# Patient Record
Sex: MALE
[De-identification: names, ages, dates, MRNs, and addresses within clinical notes are randomized per-mention and may not be internally consistent; named-entity substitution may affect disease eponyms.]

---

## 2023-03-16 PROBLEM — Z00.00 ENCOUNTER FOR PREVENTIVE HEALTH EXAMINATION: Status: ACTIVE | Noted: 2023-03-16

## 2023-03-17 ENCOUNTER — APPOINTMENT (OUTPATIENT)
Dept: UROLOGY | Facility: CLINIC | Age: 63
End: 2023-03-17
Payer: COMMERCIAL

## 2023-03-17 VITALS
TEMPERATURE: 98.6 F | RESPIRATION RATE: 16 BRPM | DIASTOLIC BLOOD PRESSURE: 87 MMHG | SYSTOLIC BLOOD PRESSURE: 138 MMHG | WEIGHT: 190 LBS | OXYGEN SATURATION: 98 % | BODY MASS INDEX: 27.2 KG/M2 | HEART RATE: 64 BPM | HEIGHT: 70 IN

## 2023-03-17 DIAGNOSIS — R39.9 UNSPECIFIED SYMPTOMS AND SIGNS INVOLVING THE GENITOURINARY SYSTEM: ICD-10-CM

## 2023-03-17 PROCEDURE — 99204 OFFICE O/P NEW MOD 45 MIN: CPT

## 2023-03-17 PROCEDURE — 51798 US URINE CAPACITY MEASURE: CPT

## 2023-03-21 LAB
BACTERIA UR CULT: NORMAL
PSA SERPL-MCNC: 2.67 NG/ML

## 2023-03-21 NOTE — HISTORY OF PRESENT ILLNESS
[FreeTextEntry1] : Language: English\par Date of First visit: 03/17/2023 \par Accompanied by: self\par Contact info: \par Referring Provider/PCP: Dr. Paul Lombardi\par Fax: [phone]\par \par CC/ Problem List:\par PSA check\par LUTS\par ===============================================================================\par FIRST VISIT / Summary:\par Very pleasant 61 yo M here for PSA check. He also has frequent urination 4-6 times in the morning and nocturia about 4 times. 1 glass coffee in morning, not much water, and wine in evenings. \par \par IPSS 9 with bother 2\par \par -------------------------------------------------------------------------------------------\par INTERVAL VISITS:\par \par ===============================================================================\par \par PMH: Siatica\par Meds: Saw Palmetto, B12, fish oil\par All: nkda\par FHx: No  malignancies. Esophageal ca, alzheimers\par SocHx: nonsmoker, etoh 4-5 per day, \par \par PSH: none\par \par ROS: Review of Systems is as per HPI unless otherwise denoted below\par \par \par ===============================================================================\par DATA: \par \par LABS (SELECTED):---------------------------------------------------------------------------------------------------\par 3/10/23: PSA 4.82 , A1c 5.7\par 03/17/2023: PSA 2.67\par \par RADS:-------------------------------------------------------------------------------------------------------------------\par \par \par PATHOLOGY/CYTOLOGY:-------------------------------------------------------------------------------------------\par \par \par VOIDING STUDIES: ----------------------------------------------------------------------------------------------------\par 03/17/2023 PVR 25\par \par STONE STUDIES: (Analysis/LLSA)----------------------------------------------------------------------------------\par \par \par PROCEDURES: -----------------------------------------------------------------------------------------------\par \par \par \par \par ===============================================================================\par \par PHYSICAL EXAM:\par \par GEN: AAOx3, NAD\par \par PSYCH: Appropriate Behavior, Affect Congruent\par \par Lungs: No labored breathing\par \par GAIT: Gait normal, Stability good\par \par ABD: no suprapubic or CVAT\par \par \par  FOCUSED: --------------(done xx/xx/xxxx)------------------------------------------------------------------------\par declined\par \par =======================================================================================\par DISCUSSION: \par =======================================================================================\par ASSESSMENT and PLAN\par \par \par 1. Elevated PSA\par - recheck significantly lower at 2.67 but will still get MRI given young age 62\par - Prostate mri pending\par \par 2. LUTS\par - we discussed that nocturia is due to etoh intake daily in evening / night\par - increase daytime hydration, limit fluids especially etoh few hours before bed\par - no medication at this time\par \par \par =======================================================================================\par Greater than 50% of this 45 minute visit was spent in direct face-to-face counseling with the patient or coordination with other care providers \par Thank you for allowing me to assist in the care of your patient. Should you have any questions please do not hesitate to reach out to me.\par \par \par Chandu Arellano MD\par [facility]\par [facility] [place]\par [address], [address]\[place] [address]\par T: [phone]\par F: [phone]

## 2023-04-27 ENCOUNTER — APPOINTMENT (OUTPATIENT)
Dept: UROLOGY | Facility: CLINIC | Age: 63
End: 2023-04-27
Payer: COMMERCIAL

## 2023-04-27 VITALS
SYSTOLIC BLOOD PRESSURE: 143 MMHG | HEART RATE: 69 BPM | DIASTOLIC BLOOD PRESSURE: 89 MMHG | WEIGHT: 190 LBS | TEMPERATURE: 98.1 F | RESPIRATION RATE: 17 BRPM | BODY MASS INDEX: 27.2 KG/M2 | HEIGHT: 70 IN | OXYGEN SATURATION: 97 %

## 2023-04-27 PROCEDURE — 99213 OFFICE O/P EST LOW 20 MIN: CPT

## 2023-04-27 NOTE — HISTORY OF PRESENT ILLNESS
[FreeTextEntry1] : Language: English\par Date of First visit: 03/17/2023 \par Accompanied by: self\par Contact info: \par Referring Provider/PCP: Dr. Paul Lombardi\par Fax: [phone]\par \par CC/ Problem List:\par PSA check\par LUTS\par ===============================================================================\par FIRST VISIT / Summary:\par Very pleasant 61 yo M here for PSA check. He also has frequent urination 4-6 times in the morning and nocturia about 4 times. 1 glass coffee in morning, not much water, and wine in evenings. \par \par IPSS 9 with bother 2\par \par -------------------------------------------------------------------------------------------\par INTERVAL VISITS:\par The patient's medications and allergies were reviewed and edited below. Dated 04/27/2023 \par \par He has minimal luts. PSA normalized and MRI pirads 2 volume 44. No interventions needed. \par ===============================================================================\par \par PMH: Siatica\par Meds: Saw Palmetto, B12, fish oil\par All: nkda\par FHx: No  malignancies. Esophageal ca, alzheimers\par SocHx: nonsmoker, etoh 4-5 per day, \par \par PSH: none\par \par ROS: Review of Systems is as per HPI unless otherwise denoted below\par \par \par ===============================================================================\par DATA: \par \par LABS (SELECTED):---------------------------------------------------------------------------------------------------\par 3/10/23: PSA 4.82 , A1c 5.7\par 03/17/2023: PSA 2.67\par \par RADS:-------------------------------------------------------------------------------------------------------------------\par 4/5/23: MRI pirads 2 with volume of 44cc. \par \par PATHOLOGY/CYTOLOGY:-------------------------------------------------------------------------------------------\par \par \par VOIDING STUDIES: ----------------------------------------------------------------------------------------------------\par 03/17/2023 PVR 25\par \par STONE STUDIES: (Analysis/LLSA)----------------------------------------------------------------------------------\par \par \par PROCEDURES: -----------------------------------------------------------------------------------------------\par \par \par \par \par ===============================================================================\par \par PHYSICAL EXAM:\par \par GEN: AAOx3, NAD\par \par PSYCH: Appropriate Behavior, Affect Congruent\par \par Lungs: No labored breathing\par \par GAIT: Gait normal, Stability good\par \par ABD: no suprapubic or CVAT\par \par \par  FOCUSED: --------------(done xx/xx/xxxx)------------------------------------------------------------------------\par declined\par \par =======================================================================================\par DISCUSSION: \par =======================================================================================\par ASSESSMENT and PLAN\par \par \par 1. Elevated PSA\par - recheck significantly lower at 2.67 but will still get MRI given young age 62\par - MRI reassuring - discussed can miss some prostate cancer but unlikely\par - recheck PSA annually\par \par 2. LUTS\par - we discussed that nocturia is due to etoh intake daily in evening / night\par - increase daytime hydration, limit fluids especially etoh few hours before bed\par - no medication at this time\par \par \par =======================================================================================\par Greater than 50% of this 45 minute visit was spent in direct face-to-face counseling with the patient or coordination with other care providers \par Thank you for allowing me to assist in the care of your patient. Should you have any questions please do not hesitate to reach out to me.\par \par \par Chandu Arellano MD\par [facility]\par [facility] at [place]\par [address], [address]\[place] [address]\par T: [phone]\par F: [phone]

## 2024-05-08 ENCOUNTER — APPOINTMENT (OUTPATIENT)
Dept: UROLOGY | Facility: CLINIC | Age: 64
End: 2024-05-08

## 2024-05-08 VITALS
RESPIRATION RATE: 14 BRPM | SYSTOLIC BLOOD PRESSURE: 148 MMHG | DIASTOLIC BLOOD PRESSURE: 94 MMHG | BODY MASS INDEX: 26.48 KG/M2 | HEIGHT: 70 IN | OXYGEN SATURATION: 97 % | HEART RATE: 80 BPM | WEIGHT: 185 LBS | TEMPERATURE: 98.1 F

## 2024-05-09 LAB
PSA FREE FLD-MCNC: 15 %
PSA FREE SERPL-MCNC: 0.31 NG/ML
PSA SERPL-MCNC: 2.03 NG/ML

## 2024-05-10 ENCOUNTER — APPOINTMENT (OUTPATIENT)
Dept: UROLOGY | Facility: CLINIC | Age: 64
End: 2024-05-10
Payer: COMMERCIAL

## 2024-05-10 DIAGNOSIS — R97.20 ELEVATED PROSTATE, SPECIFIC ANTIGEN [PSA]: ICD-10-CM

## 2024-05-10 PROCEDURE — 99213 OFFICE O/P EST LOW 20 MIN: CPT

## 2024-05-10 NOTE — HISTORY OF PRESENT ILLNESS
[FreeTextEntry1] : Language: English Date of First visit: 03/17/2023 Accompanied by: self Contact info: Referring Provider/PCP: Dr. Paul Lombardi Fax: [phone]  CC/CHAY Problem List: PSA check LUTS  =============================================================================== FIRST VISIT / Summary: Very pleasant 61 yo M here for PSA check. He also has frequent urination 4-6 times in the morning and nocturia about 4 times. 1 glass coffee in morning, not much water, and wine in evenings.  IPSS 9 with bother 2  ------------------------------------------------------------------------------------------- INTERVAL VISITS: The patient's medications and allergies were reviewed and edited below. Dated 05/08/2024  Today's visit was completed via telehealth, with both audio and visual technology available to the patient based on clinical needs and request. All A/V components of this visit was completed using a HIPAA compliant platform. I was present at [facility] office for the visit and the patient was at home. The patient verbally consented to telehealth visit today for PSA result   PSA last year 2.67 on recheck (prior was 4.8). He gave blood 05/08/2024 and now appointment for result discussion. He has no symptoms.   ===============================================================================  PMH: Siatica Meds: Saw Palmetto, B12, fish oil All: nkda FHx: No  malignancies. Esophageal ca, alzheimers SocHx: nonsmoker, etoh 4-5 per day,   PSH: none  ROS: Review of Systems is as per HPI unless otherwise denoted below   =============================================================================== DATA: LABS (SELECTED):--------------------------------------------------------------------------------------------------- 3/10/23: PSA 4.82 , A1c 5.7 03/17/2023: PSA 2.67  RADS:------------------------------------------------------------------------------------------------------------------- 4/5/23: MRI pirads 2 with volume of 44cc.   PATHOLOGY/CYTOLOGY:-------------------------------------------------------------------------------------------   VOIDING STUDIES: ---------------------------------------------------------------------------------------------------- 03/17/2023 PVR 25 05/08/2024 Uroflow   STONE STUDIES: (Analysis/LLSA)----------------------------------------------------------------------------------   PROCEDURES: -----------------------------------------------------------------------------------------------    ===============================================================================  PHYSICAL EXAM: GEN: AAOx3, NAD PSYCH: Appropriate Behavior, Affect Congruent Lungs: No labored breathing GAIT: Gait normal, Stability good ABD: no suprapubic or CVAT   FOCUSED: --------------(done xx/xx/xxxx)------------------------------------------------------------------------ declined  ======================================================================================= DISCUSSION:  ======================================================================================= ASSESSMENT and PLAN 1. Elevated PSA - recheck significantly lower at 2.67 but will still got MRI given young age - this was reassuring - PSA drawn 05/08/2024 2.03  2. LUTS - we discussed that nocturia is due to etoh intake daily in evening / night - increase daytime hydration, limit fluids especially etoh few hours before bed - no medication at this time  ======================================================================================= 3 Thank you for allowing me to assist in the care of your patient. Should you have any questions please do not hesitate to reach out to me.  Chandu Arellano MD                                                             [facility]  [place] Office: [address], [address]    [place] [address]     T: [phone]     F: [phone]      [place] Office: [address], [address] [place] [address] T: [phone] F: [phone]